# Patient Record
Sex: FEMALE | Race: WHITE | Employment: UNEMPLOYED | URBAN - METROPOLITAN AREA
[De-identification: names, ages, dates, MRNs, and addresses within clinical notes are randomized per-mention and may not be internally consistent; named-entity substitution may affect disease eponyms.]

---

## 2022-09-05 ENCOUNTER — APPOINTMENT (OUTPATIENT)
Dept: CT IMAGING | Age: 62
End: 2022-09-05
Payer: MEDICARE

## 2022-09-05 ENCOUNTER — APPOINTMENT (OUTPATIENT)
Dept: GENERAL RADIOLOGY | Age: 62
End: 2022-09-05
Payer: MEDICARE

## 2022-09-05 ENCOUNTER — HOSPITAL ENCOUNTER (EMERGENCY)
Age: 62
Discharge: HOME | End: 2022-09-05
Attending: EMERGENCY MEDICINE
Payer: MEDICARE

## 2022-09-05 VITALS
OXYGEN SATURATION: 95 % | DIASTOLIC BLOOD PRESSURE: 76 MMHG | WEIGHT: 200 LBS | BODY MASS INDEX: 29.62 KG/M2 | HEART RATE: 80 BPM | HEIGHT: 69 IN | RESPIRATION RATE: 18 BRPM | TEMPERATURE: 98.4 F | SYSTOLIC BLOOD PRESSURE: 132 MMHG

## 2022-09-05 DIAGNOSIS — R09.89 GLOBUS SENSATION: Primary | ICD-10-CM

## 2022-09-05 DIAGNOSIS — R09.89 FOREIGN BODY SENSATION IN THROAT: ICD-10-CM

## 2022-09-05 LAB
A/G RATIO: 1.4 (ref 1.1–2.2)
ALBUMIN SERPL-MCNC: 4.3 G/DL (ref 3.4–5)
ALP BLD-CCNC: 110 U/L (ref 40–129)
ALT SERPL-CCNC: 11 U/L (ref 10–40)
ANION GAP SERPL CALCULATED.3IONS-SCNC: 12 MMOL/L (ref 3–16)
AST SERPL-CCNC: 20 U/L (ref 15–37)
BASOPHILS ABSOLUTE: 0 K/UL (ref 0–0.2)
BASOPHILS RELATIVE PERCENT: 0.3 %
BILIRUB SERPL-MCNC: 0.4 MG/DL (ref 0–1)
BUN BLDV-MCNC: 10 MG/DL (ref 7–20)
CALCIUM SERPL-MCNC: 9.2 MG/DL (ref 8.3–10.6)
CHLORIDE BLD-SCNC: 100 MMOL/L (ref 99–110)
CO2: 25 MMOL/L (ref 21–32)
CREAT SERPL-MCNC: 0.9 MG/DL (ref 0.6–1.2)
EOSINOPHILS ABSOLUTE: 0.1 K/UL (ref 0–0.6)
EOSINOPHILS RELATIVE PERCENT: 0.5 %
GFR AFRICAN AMERICAN: >60
GFR NON-AFRICAN AMERICAN: >60
GLUCOSE BLD-MCNC: 101 MG/DL (ref 70–99)
HCT VFR BLD CALC: 40.9 % (ref 36–48)
HEMOGLOBIN: 14 G/DL (ref 12–16)
LYMPHOCYTES ABSOLUTE: 2.1 K/UL (ref 1–5.1)
LYMPHOCYTES RELATIVE PERCENT: 16.3 %
MAGNESIUM: 2 MG/DL (ref 1.8–2.4)
MCH RBC QN AUTO: 30.2 PG (ref 26–34)
MCHC RBC AUTO-ENTMCNC: 34.2 G/DL (ref 31–36)
MCV RBC AUTO: 88.3 FL (ref 80–100)
MONOCYTES ABSOLUTE: 0.9 K/UL (ref 0–1.3)
MONOCYTES RELATIVE PERCENT: 6.6 %
NEUTROPHILS ABSOLUTE: 9.9 K/UL (ref 1.7–7.7)
NEUTROPHILS RELATIVE PERCENT: 76.3 %
PDW BLD-RTO: 15.1 % (ref 12.4–15.4)
PLATELET # BLD: 245 K/UL (ref 135–450)
PMV BLD AUTO: 7.1 FL (ref 5–10.5)
POTASSIUM REFLEX MAGNESIUM: 3.3 MMOL/L (ref 3.5–5.1)
RBC # BLD: 4.63 M/UL (ref 4–5.2)
SODIUM BLD-SCNC: 137 MMOL/L (ref 136–145)
TOTAL PROTEIN: 7.3 G/DL (ref 6.4–8.2)
WBC # BLD: 13 K/UL (ref 4–11)

## 2022-09-05 PROCEDURE — 83735 ASSAY OF MAGNESIUM: CPT

## 2022-09-05 PROCEDURE — 80053 COMPREHEN METABOLIC PANEL: CPT

## 2022-09-05 PROCEDURE — 85025 COMPLETE CBC W/AUTO DIFF WBC: CPT

## 2022-09-05 PROCEDURE — 99284 EMERGENCY DEPT VISIT MOD MDM: CPT

## 2022-09-05 PROCEDURE — 70490 CT SOFT TISSUE NECK W/O DYE: CPT

## 2022-09-05 PROCEDURE — 71045 X-RAY EXAM CHEST 1 VIEW: CPT

## 2022-09-05 PROCEDURE — 36415 COLL VENOUS BLD VENIPUNCTURE: CPT

## 2022-09-05 PROCEDURE — 6370000000 HC RX 637 (ALT 250 FOR IP): Performed by: EMERGENCY MEDICINE

## 2022-09-05 RX ADMIN — LIDOCAINE HYDROCHLORIDE: 20 SOLUTION ORAL; TOPICAL at 21:43

## 2022-09-05 ASSESSMENT — ENCOUNTER SYMPTOMS
SORE THROAT: 1
COLOR CHANGE: 0
SHORTNESS OF BREATH: 0
NAUSEA: 0
TROUBLE SWALLOWING: 0
VOICE CHANGE: 0
VOMITING: 0
STRIDOR: 0
FACIAL SWELLING: 0
WHEEZING: 0
ABDOMINAL PAIN: 0

## 2022-09-05 ASSESSMENT — PAIN DESCRIPTION - LOCATION: LOCATION: THROAT

## 2022-09-05 ASSESSMENT — PAIN - FUNCTIONAL ASSESSMENT
PAIN_FUNCTIONAL_ASSESSMENT: 0-10
PAIN_FUNCTIONAL_ASSESSMENT: 0-10

## 2022-09-05 ASSESSMENT — PAIN SCALES - GENERAL
PAINLEVEL_OUTOF10: 4
PAINLEVEL_OUTOF10: 5

## 2022-09-05 ASSESSMENT — PAIN DESCRIPTION - PAIN TYPE: TYPE: ACUTE PAIN

## 2022-09-05 NOTE — ED PROVIDER NOTES
[address] ED  eMERGENCY dEPARTMENT eNCOUnter      Pt Name: Gigi Alcazar  MRN: [id number]  [place] 1960  Date of evaluation: 9/5/2022  Provider: Naomi Wall MD    [address] [place] [address]       Chief Complaint   Patient presents with    Airway Obstruction     Pt was using her rescue inhaler about 2 hours ago and believes she swallowed a piece of plastic from the inhaler. States she cannot swallow. Got 125 solumedrol and 4 zofran en route. HISTORY OF PRESENT ILLNESS   (Location/Symptom, Timing/Onset, Context/Setting, Quality, Duration, Modifying Factors, Severity)  Note limiting factors. Gigi Alcazar is a 65 y.o. female who presents for possible foreign body in throat. Patient reports that she was using her rescue Hailer approximately 2 hours ago and felt a sudden sensation of a foreign body in the back of her throat that she swallowed. Patient reports discomfort when swallowing since then however has been able to swallow tolerating liquids without any regurgitation or difficulty and handling oral secretions normally. Patient denies eating any food for several hours prior to this incident and denies any concern for solid food impaction. Patient reports that when she was using her inhaler she felt the sudden sensation and is unsure if \"a bug or something got in my throat\" or if it could be a piece of plastic from the inhaler although she reports she does not know of any missing plastic from the inhaler. She denies any fever. Denies any nausea vomiting or diarrhea. Reports her symptoms are sudden onset moderate constant and unchanged. HPI    Nursing Notes were reviewed. REVIEW OFSYSTEMS    (2-9 systems for level 4, 10 or more for level 5)     Review of Systems   Constitutional:  Negative for appetite change, fever and unexpected weight change. HENT:  Positive for sore throat.  Negative for facial swelling, trouble swallowing (Handling oral secretions normally) and voice Notable for the following components:    Potassium reflex Magnesium 3.3 (*)     Glucose 101 (*)     All other components within normal limits   MAGNESIUM       All otherlabs were within normal range or not returned as of this dictation. EMERGENCY DEPARTMENT COURSE and DIFFERENTIAL DIAGNOSIS/MDM:   Vitals:    Vitals:    09/05/22 1951 09/05/22 2102 09/05/22 2103 09/05/22 2129   BP: (!) 170/92 139/82     Pulse:       Resp:       Temp:       SpO2:  90% 93% 95%   Weight:       Height:             MDM  Patient has mild leukocytosis at 13.0 but is otherwise unremarkable. Vital signs are within normal limits and patient breathing normally, speaking normally, handling oral secretions normally. No difficulty phonating or oxygenating. Chest x-ray does not show any radiopaque foreign body. CT soft tissue neck is obtained and does not show any radiopaque foreign object but does show faint groundglass opacities in the right lung apex. We have discussed different options with the patient including admission for monitoring or discharge with referral and patient prefers for discharge with referral at this time. We have discussed possibility of aspiration as well as PE CT soft tissue neck CT scan versus esophageal foreign body and the patient reports that she notices this sensation when she swallows but not when she breathes. She is given a GI cocktail and reports substantial relief of symptoms further indicating possible esophageal involvement. Shared medical social making was used to discharge patient home with GI referral for possible endoscopic and very strict ER return precautions for any difficulty breathing or swallowing in the meantime. The patient's son is present at this conversation and reports that she will watch over the patient bring her back propranolol 1 if any symptoms worsen. Patient expresses understanding and agreement with this plan and is discharged home.      Procedures    FINAL IMPRESSION      1. Globus sensation    2. Foreign body sensation in throat          DISPOSITION/PLAN   DISPOSITION Decision To Discharge 09/05/2022 10:31:32 PM      PATIENT REFERRED TO:  Andrea Heaton MD  [address], [address] [facility],[address]  [address] [facility] [phone]    In 1 day      [facility] ED  [address]  [phone]    If symptoms worsen             (Please note that portions of this note were completed with a voice recognition program.  Efforts were made to edit the dictations but occasionally words aremis-transcribed. )    Shraddha Clancy MD (electronically signed)  Attending Emergency Physician           Shraddha Clancy MD  09/05/22 1406

## 2022-09-06 NOTE — DISCHARGE INSTRUCTIONS
Please call the GI doctor in the morning to arrange a clinic visit. If at any point in time you have difficulty breathing swallowing or speaking please immediately come back to the ER.